# Patient Record
Sex: FEMALE
[De-identification: names, ages, dates, MRNs, and addresses within clinical notes are randomized per-mention and may not be internally consistent; named-entity substitution may affect disease eponyms.]

---

## 2017-10-27 ENCOUNTER — RESULT REVIEW (OUTPATIENT)
Age: 47
End: 2017-10-27

## 2018-05-30 ENCOUNTER — RX RENEWAL (OUTPATIENT)
Age: 48
End: 2018-05-30

## 2018-05-30 DIAGNOSIS — L30.9 DERMATITIS, UNSPECIFIED: ICD-10-CM

## 2018-05-30 RX ORDER — TRIAMCINOLONE ACETONIDE 1 MG/G
0.1 CREAM TOPICAL
Qty: 80 | Refills: 1 | Status: ACTIVE | COMMUNITY
Start: 2018-05-30 | End: 1900-01-01

## 2018-07-02 ENCOUNTER — RX RENEWAL (OUTPATIENT)
Age: 48
End: 2018-07-02

## 2018-07-06 ENCOUNTER — RX RENEWAL (OUTPATIENT)
Age: 48
End: 2018-07-06

## 2018-07-19 ENCOUNTER — RX RENEWAL (OUTPATIENT)
Age: 48
End: 2018-07-19

## 2018-07-28 ENCOUNTER — RESULT REVIEW (OUTPATIENT)
Age: 48
End: 2018-07-28

## 2021-02-11 ENCOUNTER — RESULT REVIEW (OUTPATIENT)
Age: 51
End: 2021-02-11

## 2023-02-10 ENCOUNTER — APPOINTMENT (OUTPATIENT)
Dept: ORTHOPEDIC SURGERY | Facility: CLINIC | Age: 53
End: 2023-02-10
Payer: COMMERCIAL

## 2023-02-10 VITALS — WEIGHT: 210 LBS | BODY MASS INDEX: 31.83 KG/M2 | HEIGHT: 68 IN

## 2023-02-10 PROCEDURE — 99203 OFFICE O/P NEW LOW 30 MIN: CPT

## 2023-02-10 NOTE — DISCUSSION/SUMMARY
[de-identified] : Discussed the nature of the diagnosis and risk and benefits of different modalities of treatment.\par Will obtain a new EMG.\par RTO after EMG.

## 2023-02-10 NOTE — HISTORY OF PRESENT ILLNESS
[10] : 10 [Radiating] : radiating [Sharp] : sharp [Full time] : Work status: full time [de-identified] : 52 year old female with 2 years of bilateral hand pain and numbness.  Her numbness is worse and painful, interrupting her sleep at night.  Has tried night time splinting for over 2 years.  Has had EMG 5 years ago.  Symptoms are not 24 hours a day.  [] : Post Surgical Visit: no [FreeTextEntry1] : B/L hands [FreeTextEntry3] : N/A Chronic [FreeTextEntry5] : 53 Y/O RHD F eval B/L Hands atraumatic chronic pain due to possible HX of CTS no prior TX  [FreeTextEntry7] : Up the arms B/L [de-identified] :

## 2023-02-10 NOTE — PHYSICAL EXAM
[de-identified] : R hand: \par Tender volar wrist \par Good finger ROM \par +Tinels \par +Phalens \par +Compression test \par \par L hand: \par +thenar atrophy \par Tender volar wrist \par Good finger ROM \par +Tinels \par +Phalens \par +Compression test \par Decreased sensation median nerve distribution\par

## 2023-03-14 ENCOUNTER — APPOINTMENT (OUTPATIENT)
Dept: NEUROLOGY | Facility: CLINIC | Age: 53
End: 2023-03-14
Payer: COMMERCIAL

## 2023-03-14 DIAGNOSIS — R20.0 ANESTHESIA OF SKIN: ICD-10-CM

## 2023-03-14 DIAGNOSIS — G56.01 CARPAL TUNNEL SYNDROME, RIGHT UPPER LIMB: ICD-10-CM

## 2023-03-14 DIAGNOSIS — R20.2 ANESTHESIA OF SKIN: ICD-10-CM

## 2023-03-14 DIAGNOSIS — G56.02 CARPAL TUNNEL SYNDROME, LEFT UPPER LIMB: ICD-10-CM

## 2023-03-14 PROCEDURE — 95911 NRV CNDJ TEST 9-10 STUDIES: CPT

## 2023-03-14 PROCEDURE — 95886 MUSC TEST DONE W/N TEST COMP: CPT

## 2023-03-24 ENCOUNTER — APPOINTMENT (OUTPATIENT)
Dept: ORTHOPEDIC SURGERY | Facility: CLINIC | Age: 53
End: 2023-03-24

## 2023-03-29 ENCOUNTER — NON-APPOINTMENT (OUTPATIENT)
Age: 53
End: 2023-03-29

## 2024-03-08 ENCOUNTER — NON-APPOINTMENT (OUTPATIENT)
Age: 54
End: 2024-03-08

## 2025-08-19 ENCOUNTER — NON-APPOINTMENT (OUTPATIENT)
Age: 55
End: 2025-08-19